# Patient Record
Sex: MALE | URBAN - METROPOLITAN AREA
[De-identification: names, ages, dates, MRNs, and addresses within clinical notes are randomized per-mention and may not be internally consistent; named-entity substitution may affect disease eponyms.]

---

## 2017-08-28 ENCOUNTER — IMPORTED ENCOUNTER (OUTPATIENT)
Dept: URBAN - METROPOLITAN AREA CLINIC 9 | Facility: CLINIC | Age: 77
End: 2017-08-28

## 2018-02-05 ENCOUNTER — IMPORTED ENCOUNTER (OUTPATIENT)
Dept: URBAN - METROPOLITAN AREA CLINIC 9 | Facility: CLINIC | Age: 78
End: 2018-02-05

## 2018-03-06 ENCOUNTER — IMPORTED ENCOUNTER (OUTPATIENT)
Dept: URBAN - METROPOLITAN AREA CLINIC 9 | Facility: CLINIC | Age: 78
End: 2018-03-06

## 2018-03-20 ENCOUNTER — IMPORTED ENCOUNTER (OUTPATIENT)
Dept: URBAN - METROPOLITAN AREA CLINIC 9 | Facility: CLINIC | Age: 78
End: 2018-03-20

## 2018-06-25 ENCOUNTER — IMPORTED ENCOUNTER (OUTPATIENT)
Dept: URBAN - METROPOLITAN AREA CLINIC 9 | Facility: CLINIC | Age: 78
End: 2018-06-25

## 2021-10-18 ASSESSMENT — VISUAL ACUITY
OS_CC: 20/30 SN
OS_PH: 20/30 SN
OD_SC: 20/80 SN
OD_SC: 20/100 +2 SN
OD_CC: 20/40 SN
OD_SC: 20/50 -2 SN
OS_CC: 20/25 SN
OD_PH: 20/60 SN
OD_PH: 20/30 SN
OS_SC: 20/40 SN
OD_CC: 20/40 +2 SN
OS_SC: 20/50 +2 SN
OS_PH: 20/40 SN
OD_SC: 20/40 SN
OS_SC: 20/70 SN
OD_SC: 20/50 +2 SN
OS_SC: 20/50 SN
OS_SC: 20/40 +2 SN

## 2021-10-18 ASSESSMENT — TONOMETRY
OS_IOP_MMHG: 15
OD_IOP_MMHG: 22
OD_IOP_MMHG: 28
OD_IOP_MMHG: 16
OS_IOP_MMHG: 11
OS_IOP_MMHG: 14
OD_IOP_MMHG: 25
OD_IOP_MMHG: 18
OS_IOP_MMHG: 21
OS_IOP_MMHG: 16

## 2021-10-18 ASSESSMENT — KERATOMETRY
OS_AXISANGLE_DEGREES: 71
OS_AXISANGLE2_DEGREES: 161
OS_K1POWER_DIOPTERS: 42
OD_AXISANGLE_DEGREES: 91
OS_AXISANGLE2_DEGREES: 158
OD_K2POWER_DIOPTERS: 43.25
OD_AXISANGLE2_DEGREES: 1
OD_AXISANGLE_DEGREES: 106
OD_K1POWER_DIOPTERS: 41.5
OS_AXISANGLE_DEGREES: 68
OS_K2POWER_DIOPTERS: 43.5
OS_K2POWER_DIOPTERS: 43.5
OS_K1POWER_DIOPTERS: 41.75
OD_K2POWER_DIOPTERS: 43.25
OD_K1POWER_DIOPTERS: 41.5
OD_AXISANGLE2_DEGREES: 16

## 2022-07-07 RX ORDER — LEVOTHYROXINE SODIUM 88 UG/1
TABLET ORAL
COMMUNITY
End: 2022-09-14 | Stop reason: SDUPTHER

## 2022-07-07 RX ORDER — CIPROFLOXACIN 500 MG/1
TABLET, FILM COATED ORAL
COMMUNITY
Start: 2022-04-22 | End: 2022-09-07

## 2022-07-07 RX ORDER — DONEPEZIL HYDROCHLORIDE 5 MG/1
TABLET, FILM COATED ORAL
COMMUNITY
Start: 2021-12-23 | End: 2022-09-07

## 2022-07-07 RX ORDER — ATORVASTATIN CALCIUM 80 MG/1
TABLET, FILM COATED ORAL
COMMUNITY
End: 2022-09-14 | Stop reason: SDUPTHER

## 2022-07-07 RX ORDER — LANSOPRAZOLE 30 MG/1
CAPSULE, DELAYED RELEASE ORAL
COMMUNITY
End: 2022-09-07 | Stop reason: SDUPTHER

## 2022-07-07 RX ORDER — LATANOPROST 50 UG/ML
SOLUTION/ DROPS OPHTHALMIC
COMMUNITY

## 2022-07-07 RX ORDER — ASPIRIN 81 MG/1
TABLET, CHEWABLE ORAL
COMMUNITY

## 2022-09-05 PROBLEM — R42 LIGHTHEADED: Status: ACTIVE | Noted: 2022-09-05

## 2022-09-05 PROBLEM — N40.1 BENIGN PROSTATIC HYPERPLASIA WITH URINARY OBSTRUCTION: Status: ACTIVE | Noted: 2022-09-05

## 2022-09-05 PROBLEM — G31.84 MILD COGNITIVE IMPAIRMENT: Status: ACTIVE | Noted: 2022-09-05

## 2022-09-05 PROBLEM — R31.0 GROSS HEMATURIA: Status: ACTIVE | Noted: 2022-09-05

## 2022-09-05 PROBLEM — Q60.2 RENAL AGENESIS AND DYSGENESIS: Status: ACTIVE | Noted: 2022-09-05

## 2022-09-05 PROBLEM — C67.9 MALIGNANT NEOPLASM OF URINARY BLADDER (HCC): Status: ACTIVE | Noted: 2022-09-05

## 2022-09-05 PROBLEM — R19.7 DIARRHEA: Status: ACTIVE | Noted: 2022-09-05

## 2022-09-05 PROBLEM — E78.00 PURE HYPERCHOLESTEROLEMIA: Status: ACTIVE | Noted: 2022-09-05

## 2022-09-05 PROBLEM — M10.9 GOUT: Status: ACTIVE | Noted: 2022-09-05

## 2022-09-05 PROBLEM — M25.511 RIGHT SHOULDER PAIN: Status: ACTIVE | Noted: 2022-09-05

## 2022-09-05 PROBLEM — E03.9 HYPOTHYROIDISM: Status: ACTIVE | Noted: 2022-09-05

## 2022-09-05 PROBLEM — R55 NEAR SYNCOPE: Status: ACTIVE | Noted: 2022-09-05

## 2022-09-05 PROBLEM — Q60.5 RENAL AGENESIS AND DYSGENESIS: Status: ACTIVE | Noted: 2022-09-05

## 2022-09-05 PROBLEM — M19.90 OSTEOARTHROSIS: Status: ACTIVE | Noted: 2022-09-05

## 2022-09-05 PROBLEM — N13.8 BENIGN PROSTATIC HYPERPLASIA WITH URINARY OBSTRUCTION: Status: ACTIVE | Noted: 2022-09-05

## 2022-09-05 PROBLEM — I10 HYPERTENSION: Status: ACTIVE | Noted: 2022-09-05

## 2022-09-07 PROBLEM — A04.72 C. DIFFICILE COLITIS: Status: ACTIVE | Noted: 2022-09-07

## 2022-09-07 PROBLEM — R63.4 WEIGHT LOSS: Status: ACTIVE | Noted: 2022-09-07

## 2022-09-07 PROBLEM — K21.9 GASTROESOPHAGEAL REFLUX DISEASE WITHOUT ESOPHAGITIS: Status: ACTIVE | Noted: 2022-09-07

## 2022-11-08 NOTE — PATIENT DISCUSSION
was treated with meds for many yrs. Pt saw another neurologist in 2018 who did further blood work which was negative for MG.

## 2023-06-15 ENCOUNTER — POST-OP (OUTPATIENT)
Dept: URBAN - METROPOLITAN AREA CLINIC 6 | Facility: CLINIC | Age: 83
End: 2023-06-15

## 2023-06-15 DIAGNOSIS — Z96.1: ICD-10-CM

## 2023-06-15 DIAGNOSIS — H25.12: ICD-10-CM

## 2023-06-15 DIAGNOSIS — H25.13: ICD-10-CM

## 2023-06-15 PROCEDURE — 99024 POSTOP FOLLOW-UP VISIT: CPT

## 2023-06-15 PROCEDURE — 92136 OPHTHALMIC BIOMETRY: CPT

## 2023-06-15 ASSESSMENT — KERATOMETRY
OS_AXISANGLE2_DEGREES: 155
OS_K1POWER_DIOPTERS: 42.25
OS_K2POWER_DIOPTERS: 44.00
OS_AXISANGLE_DEGREES: 65

## 2023-06-15 ASSESSMENT — VISUAL ACUITY: OD_SC: CF 1FT

## 2023-06-15 ASSESSMENT — TONOMETRY: OD_IOP_MMHG: 16

## 2023-06-23 ENCOUNTER — POST-OP (OUTPATIENT)
Dept: URBAN - METROPOLITAN AREA CLINIC 6 | Facility: CLINIC | Age: 83
End: 2023-06-23

## 2023-06-23 DIAGNOSIS — H25.13: ICD-10-CM

## 2023-06-23 DIAGNOSIS — Z96.1: ICD-10-CM

## 2023-06-23 DIAGNOSIS — H25.12: ICD-10-CM

## 2023-06-23 DIAGNOSIS — H40.1113: ICD-10-CM

## 2023-06-23 PROCEDURE — 92136 OPHTHALMIC BIOMETRY: CPT

## 2023-06-23 PROCEDURE — 99024 POSTOP FOLLOW-UP VISIT: CPT

## 2023-06-23 ASSESSMENT — VISUAL ACUITY
OS_SC: 20/50
OD_SC: 20/100

## 2023-06-23 ASSESSMENT — KERATOMETRY
OS_K2POWER_DIOPTERS: 44.00
OS_AXISANGLE_DEGREES: 65
OS_K1POWER_DIOPTERS: 42.25
OS_AXISANGLE2_DEGREES: 155

## 2023-06-23 ASSESSMENT — TONOMETRY
OD_IOP_MMHG: 19
OS_IOP_MMHG: 14

## 2023-06-29 ENCOUNTER — POST-OP (OUTPATIENT)
Dept: URBAN - METROPOLITAN AREA CLINIC 6 | Facility: CLINIC | Age: 83
End: 2023-06-29

## 2023-06-29 DIAGNOSIS — Z96.1: ICD-10-CM

## 2023-06-29 DIAGNOSIS — H40.1121: ICD-10-CM

## 2023-06-29 PROCEDURE — 99024 POSTOP FOLLOW-UP VISIT: CPT

## 2023-06-29 ASSESSMENT — TONOMETRY: OS_IOP_MMHG: 10

## 2023-06-29 ASSESSMENT — KERATOMETRY
OS_AXISANGLE_DEGREES: 65
OS_K1POWER_DIOPTERS: 42.25
OS_AXISANGLE2_DEGREES: 155
OS_K2POWER_DIOPTERS: 44.00

## 2023-06-29 ASSESSMENT — VISUAL ACUITY: OS_SC: 20/30

## 2023-07-17 ENCOUNTER — POST-OP (OUTPATIENT)
Dept: URBAN - METROPOLITAN AREA CLINIC 6 | Facility: CLINIC | Age: 83
End: 2023-07-17

## 2023-07-17 DIAGNOSIS — H40.1113: ICD-10-CM

## 2023-07-17 DIAGNOSIS — H40.1121: ICD-10-CM

## 2023-07-17 DIAGNOSIS — Z96.1: ICD-10-CM

## 2023-07-17 PROCEDURE — 99024 POSTOP FOLLOW-UP VISIT: CPT

## 2023-07-17 RX ORDER — LATANOPROST 50 UG/ML: 1 SOLUTION/ DROPS OPHTHALMIC

## 2023-07-17 ASSESSMENT — VISUAL ACUITY
OS_SC: 20/30
OD_SC: 20/400

## 2023-07-17 ASSESSMENT — TONOMETRY
OD_IOP_MMHG: 10
OS_IOP_MMHG: 10

## 2023-07-17 ASSESSMENT — KERATOMETRY
OS_AXISANGLE2_DEGREES: 155
OS_AXISANGLE_DEGREES: 65
OS_K1POWER_DIOPTERS: 42.25
OS_K2POWER_DIOPTERS: 44.00

## 2023-11-13 ENCOUNTER — FOLLOW UP (OUTPATIENT)
Dept: URBAN - METROPOLITAN AREA CLINIC 10 | Facility: CLINIC | Age: 83
End: 2023-11-13

## 2023-11-13 DIAGNOSIS — H40.1121: ICD-10-CM

## 2023-11-13 DIAGNOSIS — Z96.1: ICD-10-CM

## 2023-11-13 DIAGNOSIS — H40.1113: ICD-10-CM

## 2023-11-13 PROCEDURE — 99213 OFFICE O/P EST LOW 20 MIN: CPT

## 2023-11-13 ASSESSMENT — VISUAL ACUITY
OS_PH: 20/25
OU_SC: 20/30
OS_SC: 20/40-2
OD_SC: 20/150+1

## 2023-11-13 ASSESSMENT — KERATOMETRY
OS_K1POWER_DIOPTERS: 42.25
OS_K2POWER_DIOPTERS: 44.00
OS_AXISANGLE2_DEGREES: 155
OS_AXISANGLE_DEGREES: 65

## 2023-11-13 ASSESSMENT — TONOMETRY
OS_IOP_MMHG: 10
OD_IOP_MMHG: 12

## 2024-05-14 ENCOUNTER — FOLLOW UP (OUTPATIENT)
Dept: URBAN - METROPOLITAN AREA CLINIC 14 | Facility: CLINIC | Age: 84
End: 2024-05-14

## 2024-05-14 DIAGNOSIS — Z96.1: ICD-10-CM

## 2024-05-14 DIAGNOSIS — H40.1121: ICD-10-CM

## 2024-05-14 DIAGNOSIS — H40.1113: ICD-10-CM

## 2024-05-14 DIAGNOSIS — H04.123: ICD-10-CM

## 2024-05-14 PROCEDURE — 92133 CPTRZD OPH DX IMG PST SGM ON: CPT

## 2024-05-14 PROCEDURE — 99213 OFFICE O/P EST LOW 20 MIN: CPT

## 2024-05-14 ASSESSMENT — KERATOMETRY
OS_AXISANGLE_DEGREES: 65
OS_K2POWER_DIOPTERS: 44.00
OS_K1POWER_DIOPTERS: 42.25
OS_AXISANGLE2_DEGREES: 155

## 2024-05-14 ASSESSMENT — TONOMETRY
OD_IOP_MMHG: 13
OS_IOP_MMHG: 11

## 2024-05-14 ASSESSMENT — VISUAL ACUITY
OS_SC: 20/40+1
OD_SC: 20/80

## 2024-11-05 ENCOUNTER — COMPREHENSIVE EXAM (OUTPATIENT)
Dept: URBAN - METROPOLITAN AREA CLINIC 14 | Facility: CLINIC | Age: 84
End: 2024-11-05

## 2024-11-05 DIAGNOSIS — H43.813: ICD-10-CM

## 2024-11-05 DIAGNOSIS — H35.711: ICD-10-CM

## 2024-11-05 DIAGNOSIS — H40.1121: ICD-10-CM

## 2024-11-05 DIAGNOSIS — H40.1113: ICD-10-CM

## 2024-11-05 DIAGNOSIS — Z96.1: ICD-10-CM

## 2024-11-05 PROCEDURE — 99214 OFFICE O/P EST MOD 30 MIN: CPT

## 2024-11-05 PROCEDURE — 92134 CPTRZ OPH DX IMG PST SGM RTA: CPT

## 2025-05-20 ENCOUNTER — FOLLOW UP (OUTPATIENT)
Age: 85
End: 2025-05-20

## 2025-05-20 DIAGNOSIS — H40.1121: ICD-10-CM

## 2025-05-20 DIAGNOSIS — H40.1113: ICD-10-CM

## 2025-05-20 DIAGNOSIS — Z96.1: ICD-10-CM

## 2025-05-20 PROCEDURE — 99213 OFFICE O/P EST LOW 20 MIN: CPT
